# Patient Record
Sex: MALE | Race: WHITE | NOT HISPANIC OR LATINO | URBAN - METROPOLITAN AREA
[De-identification: names, ages, dates, MRNs, and addresses within clinical notes are randomized per-mention and may not be internally consistent; named-entity substitution may affect disease eponyms.]

---

## 2021-04-18 ENCOUNTER — HOSPITAL ENCOUNTER (INPATIENT)
Facility: HOSPITAL | Age: 31
LOS: 1 days | Discharge: HOME | End: 2021-04-19
Attending: EMERGENCY MEDICINE | Admitting: HOSPITALIST

## 2021-04-18 ENCOUNTER — APPOINTMENT (OUTPATIENT)
Dept: GENERAL RADIOLOGY | Facility: HOSPITAL | Age: 31
End: 2021-04-18

## 2021-04-18 DIAGNOSIS — F15.10 METHAMPHETAMINE ABUSE (HCC): ICD-10-CM

## 2021-04-18 DIAGNOSIS — R07.2 PRECORDIAL PAIN: ICD-10-CM

## 2021-04-18 DIAGNOSIS — E10.10 DIABETIC KETOACIDOSIS WITHOUT COMA ASSOCIATED WITH TYPE 1 DIABETES MELLITUS (HCC): Primary | ICD-10-CM

## 2021-04-18 PROBLEM — E87.29 HIGH ANION GAP METABOLIC ACIDOSIS: Status: ACTIVE | Noted: 2021-04-18

## 2021-04-18 LAB
ACETONE BLD QL: ABNORMAL
ALBUMIN SERPL-MCNC: 3.8 G/DL (ref 3.5–5.2)
ALBUMIN/GLOB SERPL: 1.2 G/DL
ALP SERPL-CCNC: 156 U/L (ref 39–117)
ALT SERPL W P-5'-P-CCNC: 126 U/L (ref 1–41)
ANION GAP SERPL CALCULATED.3IONS-SCNC: 11 MMOL/L (ref 5–15)
ANION GAP SERPL CALCULATED.3IONS-SCNC: 18 MMOL/L (ref 5–15)
ANION GAP SERPL CALCULATED.3IONS-SCNC: 32 MMOL/L (ref 5–15)
AST SERPL-CCNC: 91 U/L (ref 1–40)
ATMOSPHERIC PRESS: ABNORMAL MM[HG]
BASE EXCESS BLDV CALC-SCNC: -14.3 MMOL/L (ref -2–2)
BASOPHILS # BLD AUTO: 0.2 10*3/MM3 (ref 0–0.2)
BASOPHILS NFR BLD AUTO: 1 % (ref 0–1.5)
BDY SITE: ABNORMAL
BILIRUB SERPL-MCNC: 0.6 MG/DL (ref 0–1.2)
BILIRUB UR QL STRIP: NEGATIVE
BUN SERPL-MCNC: 14 MG/DL (ref 6–20)
BUN SERPL-MCNC: 18 MG/DL (ref 6–20)
BUN SERPL-MCNC: 20 MG/DL (ref 6–20)
BUN/CREAT SERPL: 14.6 (ref 7–25)
BUN/CREAT SERPL: 15.8 (ref 7–25)
BUN/CREAT SERPL: 16.9 (ref 7–25)
CALCIUM SPEC-SCNC: 8.3 MG/DL (ref 8.6–10.5)
CALCIUM SPEC-SCNC: 9.3 MG/DL (ref 8.6–10.5)
CALCIUM SPEC-SCNC: 9.9 MG/DL (ref 8.6–10.5)
CHLORIDE SERPL-SCNC: 103 MMOL/L (ref 98–107)
CHLORIDE SERPL-SCNC: 87 MMOL/L (ref 98–107)
CHLORIDE SERPL-SCNC: 98 MMOL/L (ref 98–107)
CLARITY UR: CLEAR
CO2 BLDA-SCNC: 13.5 MMOL/L (ref 22–29)
CO2 SERPL-SCNC: 10 MMOL/L (ref 22–29)
CO2 SERPL-SCNC: 18 MMOL/L (ref 22–29)
CO2 SERPL-SCNC: 23 MMOL/L (ref 22–29)
COLOR UR: YELLOW
CREAT SERPL-MCNC: 0.96 MG/DL (ref 0.76–1.27)
CREAT SERPL-MCNC: 1.14 MG/DL (ref 0.76–1.27)
CREAT SERPL-MCNC: 1.18 MG/DL (ref 0.76–1.27)
DEPRECATED RDW RBC AUTO: 43.3 FL (ref 37–54)
EOSINOPHIL # BLD AUTO: 0.1 10*3/MM3 (ref 0–0.4)
EOSINOPHIL NFR BLD AUTO: 0.6 % (ref 0.3–6.2)
ERYTHROCYTE [DISTWIDTH] IN BLOOD BY AUTOMATED COUNT: 12.7 % (ref 12.3–15.4)
GFR SERPL CREATININE-BSD FRML MDRD: 72 ML/MIN/1.73
GFR SERPL CREATININE-BSD FRML MDRD: 75 ML/MIN/1.73
GFR SERPL CREATININE-BSD FRML MDRD: 91 ML/MIN/1.73
GLOBULIN UR ELPH-MCNC: 3.1 GM/DL
GLUCOSE BLDC GLUCOMTR-MCNC: 110 MG/DL (ref 70–105)
GLUCOSE BLDC GLUCOMTR-MCNC: 120 MG/DL (ref 70–105)
GLUCOSE BLDC GLUCOMTR-MCNC: 149 MG/DL (ref 70–105)
GLUCOSE BLDC GLUCOMTR-MCNC: 173 MG/DL (ref 70–105)
GLUCOSE BLDC GLUCOMTR-MCNC: 220 MG/DL (ref 70–105)
GLUCOSE BLDC GLUCOMTR-MCNC: 283 MG/DL (ref 70–105)
GLUCOSE BLDC GLUCOMTR-MCNC: 294 MG/DL (ref 70–105)
GLUCOSE BLDC GLUCOMTR-MCNC: 368 MG/DL (ref 70–105)
GLUCOSE BLDC GLUCOMTR-MCNC: 432 MG/DL (ref 70–105)
GLUCOSE BLDC GLUCOMTR-MCNC: >500 MG/DL (ref 70–105)
GLUCOSE SERPL-MCNC: 138 MG/DL (ref 65–99)
GLUCOSE SERPL-MCNC: 328 MG/DL (ref 65–99)
GLUCOSE SERPL-MCNC: 572 MG/DL (ref 65–99)
GLUCOSE UR STRIP-MCNC: ABNORMAL MG/DL
HCO3 BLDV-SCNC: 12.5 MMOL/L (ref 22–26)
HCT VFR BLD AUTO: 43.7 % (ref 37.5–51)
HGB BLD-MCNC: 14.6 G/DL (ref 13–17.7)
HGB UR QL STRIP.AUTO: NEGATIVE
HOLD SPECIMEN: NORMAL
KETONES UR QL STRIP: ABNORMAL
LEUKOCYTE ESTERASE UR QL STRIP.AUTO: NEGATIVE
LIPASE SERPL-CCNC: 33 U/L (ref 13–60)
LYMPHOCYTES # BLD AUTO: 5.1 10*3/MM3 (ref 0.7–3.1)
LYMPHOCYTES NFR BLD AUTO: 24.8 % (ref 19.6–45.3)
MCH RBC QN AUTO: 32.3 PG (ref 26.6–33)
MCHC RBC AUTO-ENTMCNC: 33.5 G/DL (ref 31.5–35.7)
MCV RBC AUTO: 96.5 FL (ref 79–97)
MODALITY: ABNORMAL
MONOCYTES # BLD AUTO: 1.5 10*3/MM3 (ref 0.1–0.9)
MONOCYTES NFR BLD AUTO: 7.2 % (ref 5–12)
NEUTROPHILS NFR BLD AUTO: 13.5 10*3/MM3 (ref 1.7–7)
NEUTROPHILS NFR BLD AUTO: 66.4 % (ref 42.7–76)
NITRITE UR QL STRIP: NEGATIVE
NRBC BLD AUTO-RTO: 0.1 /100 WBC (ref 0–0.2)
PCO2 BLDV: 32 MM HG (ref 42–51)
PH BLDV: 7.2 PH UNITS (ref 7.32–7.43)
PH UR STRIP.AUTO: <=5 [PH] (ref 5–8)
PLATELET # BLD AUTO: 323 10*3/MM3 (ref 140–450)
PMV BLD AUTO: 9.9 FL (ref 6–12)
PO2 BLDV: 41.5 MM HG (ref 40–42)
POTASSIUM SERPL-SCNC: 4.3 MMOL/L (ref 3.5–5.2)
POTASSIUM SERPL-SCNC: 4.4 MMOL/L (ref 3.5–5.2)
POTASSIUM SERPL-SCNC: 4.5 MMOL/L (ref 3.5–5.2)
PROT SERPL-MCNC: 6.9 G/DL (ref 6–8.5)
PROT UR QL STRIP: NEGATIVE
QT INTERVAL: 444 MS
RBC # BLD AUTO: 4.53 10*6/MM3 (ref 4.14–5.8)
SAO2 % BLDCOV: 66.4 % (ref 95–99)
SODIUM SERPL-SCNC: 129 MMOL/L (ref 136–145)
SODIUM SERPL-SCNC: 134 MMOL/L (ref 136–145)
SODIUM SERPL-SCNC: 137 MMOL/L (ref 136–145)
SP GR UR STRIP: 1.03 (ref 1–1.03)
TROPONIN T SERPL-MCNC: <0.01 NG/ML (ref 0–0.03)
UROBILINOGEN UR QL STRIP: ABNORMAL
WBC # BLD AUTO: 20.4 10*3/MM3 (ref 3.4–10.8)

## 2021-04-18 PROCEDURE — 25010000002 DIPHENHYDRAMINE PER 50 MG: Performed by: EMERGENCY MEDICINE

## 2021-04-18 PROCEDURE — 25010000002 CEFTRIAXONE PER 250 MG: Performed by: NURSE PRACTITIONER

## 2021-04-18 PROCEDURE — 82962 GLUCOSE BLOOD TEST: CPT

## 2021-04-18 PROCEDURE — 63710000001 INSULIN REGULAR HUMAN PER 5 UNITS: Performed by: HOSPITALIST

## 2021-04-18 PROCEDURE — 80048 BASIC METABOLIC PNL TOTAL CA: CPT | Performed by: HOSPITALIST

## 2021-04-18 PROCEDURE — 85025 COMPLETE CBC W/AUTO DIFF WBC: CPT | Performed by: EMERGENCY MEDICINE

## 2021-04-18 PROCEDURE — 82803 BLOOD GASES ANY COMBINATION: CPT

## 2021-04-18 PROCEDURE — 83690 ASSAY OF LIPASE: CPT | Performed by: EMERGENCY MEDICINE

## 2021-04-18 PROCEDURE — 99285 EMERGENCY DEPT VISIT HI MDM: CPT

## 2021-04-18 PROCEDURE — 93005 ELECTROCARDIOGRAM TRACING: CPT | Performed by: EMERGENCY MEDICINE

## 2021-04-18 PROCEDURE — 81003 URINALYSIS AUTO W/O SCOPE: CPT | Performed by: HOSPITALIST

## 2021-04-18 PROCEDURE — 82009 KETONE BODYS QUAL: CPT | Performed by: EMERGENCY MEDICINE

## 2021-04-18 PROCEDURE — 25010000002 ENOXAPARIN PER 10 MG: Performed by: HOSPITALIST

## 2021-04-18 PROCEDURE — 84484 ASSAY OF TROPONIN QUANT: CPT | Performed by: EMERGENCY MEDICINE

## 2021-04-18 PROCEDURE — 99223 1ST HOSP IP/OBS HIGH 75: CPT | Performed by: HOSPITALIST

## 2021-04-18 PROCEDURE — 83036 HEMOGLOBIN GLYCOSYLATED A1C: CPT | Performed by: HOSPITALIST

## 2021-04-18 PROCEDURE — 80053 COMPREHEN METABOLIC PANEL: CPT | Performed by: EMERGENCY MEDICINE

## 2021-04-18 PROCEDURE — 25010000002 DROPERIDOL PER 5 MG: Performed by: EMERGENCY MEDICINE

## 2021-04-18 PROCEDURE — 63710000001 INSULIN GLARGINE PER 5 UNITS: Performed by: NURSE PRACTITIONER

## 2021-04-18 PROCEDURE — 87040 BLOOD CULTURE FOR BACTERIA: CPT | Performed by: EMERGENCY MEDICINE

## 2021-04-18 PROCEDURE — 71045 X-RAY EXAM CHEST 1 VIEW: CPT

## 2021-04-18 PROCEDURE — 63710000001 INSULIN REGULAR HUMAN PER 5 UNITS: Performed by: EMERGENCY MEDICINE

## 2021-04-18 RX ORDER — INSULIN GLARGINE 100 [IU]/ML
10 INJECTION, SOLUTION SUBCUTANEOUS EVERY 12 HOURS SCHEDULED
Status: DISCONTINUED | OUTPATIENT
Start: 2021-04-18 | End: 2021-04-19 | Stop reason: HOSPADM

## 2021-04-18 RX ORDER — DROPERIDOL 2.5 MG/ML
1.25 INJECTION, SOLUTION INTRAMUSCULAR; INTRAVENOUS ONCE
Status: COMPLETED | OUTPATIENT
Start: 2021-04-18 | End: 2021-04-18

## 2021-04-18 RX ORDER — CHOLECALCIFEROL (VITAMIN D3) 125 MCG
5 CAPSULE ORAL NIGHTLY PRN
Status: DISCONTINUED | OUTPATIENT
Start: 2021-04-18 | End: 2021-04-19 | Stop reason: HOSPADM

## 2021-04-18 RX ORDER — DEXTROSE, SODIUM CHLORIDE, AND POTASSIUM CHLORIDE 5; .9; .15 G/100ML; G/100ML; G/100ML
150 INJECTION INTRAVENOUS CONTINUOUS PRN
Status: DISCONTINUED | OUTPATIENT
Start: 2021-04-18 | End: 2021-04-18

## 2021-04-18 RX ORDER — ACETAMINOPHEN 325 MG/1
650 TABLET ORAL EVERY 4 HOURS PRN
Status: DISCONTINUED | OUTPATIENT
Start: 2021-04-18 | End: 2021-04-19 | Stop reason: HOSPADM

## 2021-04-18 RX ORDER — DEXTROSE MONOHYDRATE 25 G/50ML
12.5 INJECTION, SOLUTION INTRAVENOUS AS NEEDED
Status: DISCONTINUED | OUTPATIENT
Start: 2021-04-18 | End: 2021-04-18

## 2021-04-18 RX ORDER — DEXTROSE AND SODIUM CHLORIDE 5; .45 G/100ML; G/100ML
150 INJECTION, SOLUTION INTRAVENOUS CONTINUOUS PRN
Status: DISCONTINUED | OUTPATIENT
Start: 2021-04-18 | End: 2021-04-18

## 2021-04-18 RX ORDER — DIPHENHYDRAMINE HYDROCHLORIDE 50 MG/ML
25 INJECTION INTRAMUSCULAR; INTRAVENOUS ONCE
Status: COMPLETED | OUTPATIENT
Start: 2021-04-18 | End: 2021-04-18

## 2021-04-18 RX ORDER — SODIUM CHLORIDE 9 MG/ML
100 INJECTION, SOLUTION INTRAVENOUS CONTINUOUS
Status: DISCONTINUED | OUTPATIENT
Start: 2021-04-18 | End: 2021-04-19 | Stop reason: HOSPADM

## 2021-04-18 RX ORDER — SODIUM CHLORIDE 0.9 % (FLUSH) 0.9 %
10 SYRINGE (ML) INJECTION ONCE AS NEEDED
Status: DISCONTINUED | OUTPATIENT
Start: 2021-04-18 | End: 2021-04-19 | Stop reason: HOSPADM

## 2021-04-18 RX ORDER — ACETAMINOPHEN 160 MG/5ML
650 SOLUTION ORAL EVERY 4 HOURS PRN
Status: DISCONTINUED | OUTPATIENT
Start: 2021-04-18 | End: 2021-04-19 | Stop reason: HOSPADM

## 2021-04-18 RX ORDER — SODIUM CHLORIDE 0.9 % (FLUSH) 0.9 %
10 SYRINGE (ML) INJECTION EVERY 12 HOURS SCHEDULED
Status: DISCONTINUED | OUTPATIENT
Start: 2021-04-18 | End: 2021-04-19 | Stop reason: HOSPADM

## 2021-04-18 RX ORDER — SODIUM CHLORIDE AND POTASSIUM CHLORIDE 300; 900 MG/100ML; MG/100ML
250 INJECTION, SOLUTION INTRAVENOUS CONTINUOUS PRN
Status: DISCONTINUED | OUTPATIENT
Start: 2021-04-18 | End: 2021-04-18

## 2021-04-18 RX ORDER — ACETAMINOPHEN 650 MG/1
650 SUPPOSITORY RECTAL EVERY 4 HOURS PRN
Status: DISCONTINUED | OUTPATIENT
Start: 2021-04-18 | End: 2021-04-19 | Stop reason: HOSPADM

## 2021-04-18 RX ORDER — SODIUM CHLORIDE 9 MG/ML
10 INJECTION, SOLUTION INTRAVENOUS CONTINUOUS PRN
Status: DISCONTINUED | OUTPATIENT
Start: 2021-04-18 | End: 2021-04-18

## 2021-04-18 RX ORDER — SODIUM CHLORIDE 9 MG/ML
250 INJECTION, SOLUTION INTRAVENOUS CONTINUOUS PRN
Status: DISCONTINUED | OUTPATIENT
Start: 2021-04-18 | End: 2021-04-18

## 2021-04-18 RX ORDER — ONDANSETRON 2 MG/ML
4 INJECTION INTRAMUSCULAR; INTRAVENOUS EVERY 6 HOURS PRN
Status: DISCONTINUED | OUTPATIENT
Start: 2021-04-18 | End: 2021-04-19 | Stop reason: HOSPADM

## 2021-04-18 RX ORDER — DEXTROSE, SODIUM CHLORIDE, AND POTASSIUM CHLORIDE 5; .45; .3 G/100ML; G/100ML; G/100ML
150 INJECTION INTRAVENOUS CONTINUOUS PRN
Status: DISCONTINUED | OUTPATIENT
Start: 2021-04-18 | End: 2021-04-18

## 2021-04-18 RX ORDER — INSULIN LISPRO 100 [IU]/ML
0-14 INJECTION, SOLUTION INTRAVENOUS; SUBCUTANEOUS AS NEEDED
Status: DISCONTINUED | OUTPATIENT
Start: 2021-04-18 | End: 2021-04-19 | Stop reason: HOSPADM

## 2021-04-18 RX ORDER — POTASSIUM CHLORIDE, DEXTROSE MONOHYDRATE AND SODIUM CHLORIDE 300; 5; 900 MG/100ML; G/100ML; MG/100ML
150 INJECTION, SOLUTION INTRAVENOUS CONTINUOUS PRN
Status: DISCONTINUED | OUTPATIENT
Start: 2021-04-18 | End: 2021-04-18

## 2021-04-18 RX ORDER — DEXTROSE AND SODIUM CHLORIDE 5; .9 G/100ML; G/100ML
150 INJECTION, SOLUTION INTRAVENOUS CONTINUOUS PRN
Status: DISCONTINUED | OUTPATIENT
Start: 2021-04-18 | End: 2021-04-18

## 2021-04-18 RX ORDER — SODIUM CHLORIDE AND POTASSIUM CHLORIDE 150; 450 MG/100ML; MG/100ML
250 INJECTION, SOLUTION INTRAVENOUS CONTINUOUS PRN
Status: DISCONTINUED | OUTPATIENT
Start: 2021-04-18 | End: 2021-04-18

## 2021-04-18 RX ORDER — INSULIN LISPRO 100 [IU]/ML
0-14 INJECTION, SOLUTION INTRAVENOUS; SUBCUTANEOUS
Status: DISCONTINUED | OUTPATIENT
Start: 2021-04-19 | End: 2021-04-19 | Stop reason: HOSPADM

## 2021-04-18 RX ORDER — NICOTINE POLACRILEX 4 MG
15 LOZENGE BUCCAL
Status: DISCONTINUED | OUTPATIENT
Start: 2021-04-18 | End: 2021-04-19 | Stop reason: HOSPADM

## 2021-04-18 RX ORDER — SODIUM CHLORIDE 0.9 % (FLUSH) 0.9 %
3 SYRINGE (ML) INJECTION EVERY 12 HOURS SCHEDULED
Status: DISCONTINUED | OUTPATIENT
Start: 2021-04-18 | End: 2021-04-18

## 2021-04-18 RX ORDER — SODIUM CHLORIDE AND POTASSIUM CHLORIDE 150; 900 MG/100ML; MG/100ML
250 INJECTION, SOLUTION INTRAVENOUS CONTINUOUS PRN
Status: DISCONTINUED | OUTPATIENT
Start: 2021-04-18 | End: 2021-04-18

## 2021-04-18 RX ORDER — SODIUM CHLORIDE 0.9 % (FLUSH) 0.9 %
10 SYRINGE (ML) INJECTION AS NEEDED
Status: DISCONTINUED | OUTPATIENT
Start: 2021-04-18 | End: 2021-04-19 | Stop reason: HOSPADM

## 2021-04-18 RX ORDER — SODIUM CHLORIDE 9 MG/ML
500 INJECTION, SOLUTION INTRAVENOUS CONTINUOUS
Status: DISCONTINUED | OUTPATIENT
Start: 2021-04-18 | End: 2021-04-18

## 2021-04-18 RX ORDER — SODIUM CHLORIDE 450 MG/100ML
250 INJECTION, SOLUTION INTRAVENOUS CONTINUOUS PRN
Status: DISCONTINUED | OUTPATIENT
Start: 2021-04-18 | End: 2021-04-18

## 2021-04-18 RX ORDER — DEXTROSE, SODIUM CHLORIDE, AND POTASSIUM CHLORIDE 5; .45; .15 G/100ML; G/100ML; G/100ML
150 INJECTION INTRAVENOUS CONTINUOUS PRN
Status: DISCONTINUED | OUTPATIENT
Start: 2021-04-18 | End: 2021-04-18

## 2021-04-18 RX ORDER — DEXTROSE MONOHYDRATE 25 G/50ML
25 INJECTION, SOLUTION INTRAVENOUS
Status: DISCONTINUED | OUTPATIENT
Start: 2021-04-18 | End: 2021-04-19 | Stop reason: HOSPADM

## 2021-04-18 RX ADMIN — SODIUM CHLORIDE 500 ML/HR: 9 INJECTION, SOLUTION INTRAVENOUS at 10:52

## 2021-04-18 RX ADMIN — DEXTROSE AND SODIUM CHLORIDE 150 ML/HR: 5; 900 INJECTION, SOLUTION INTRAVENOUS at 17:42

## 2021-04-18 RX ADMIN — ENOXAPARIN SODIUM 40 MG: 40 INJECTION SUBCUTANEOUS at 15:28

## 2021-04-18 RX ADMIN — SODIUM CHLORIDE 1000 ML: 9 INJECTION, SOLUTION INTRAVENOUS at 09:04

## 2021-04-18 RX ADMIN — DROPERIDOL 1.25 MG: 2.5 INJECTION, SOLUTION INTRAMUSCULAR; INTRAVENOUS at 09:27

## 2021-04-18 RX ADMIN — DIPHENHYDRAMINE HYDROCHLORIDE 25 MG: 50 INJECTION, SOLUTION INTRAMUSCULAR; INTRAVENOUS at 09:03

## 2021-04-18 RX ADMIN — DROPERIDOL 1.25 MG: 2.5 INJECTION, SOLUTION INTRAMUSCULAR; INTRAVENOUS at 09:03

## 2021-04-18 RX ADMIN — Medication 10 ML: at 12:55

## 2021-04-18 RX ADMIN — INSULIN GLARGINE 10 UNITS: 100 INJECTION, SOLUTION SUBCUTANEOUS at 19:36

## 2021-04-18 RX ADMIN — CEFTRIAXONE SODIUM 1 G: 1 INJECTION, POWDER, FOR SOLUTION INTRAMUSCULAR; INTRAVENOUS at 20:21

## 2021-04-18 RX ADMIN — SODIUM CHLORIDE 1000 ML: 0.9 INJECTION, SOLUTION INTRAVENOUS at 09:05

## 2021-04-18 RX ADMIN — SODIUM CHLORIDE 7 UNITS/HR: 9 INJECTION, SOLUTION INTRAVENOUS at 12:15

## 2021-04-18 RX ADMIN — SODIUM CHLORIDE 100 ML/HR: 9 INJECTION, SOLUTION INTRAVENOUS at 20:20

## 2021-04-18 RX ADMIN — SODIUM CHLORIDE 6 UNITS/HR: 9 INJECTION, SOLUTION INTRAVENOUS at 10:19

## 2021-04-18 NOTE — ED PROVIDER NOTES
Subjective   History of Present Illness  Context: 30-year-old history of insulin pen diabetes presents with elevated blood sugar.  He states he last used insulin yesterday.  He is currently out of supplies to continue giving himself insulin. he complains of pain in his chest.  He does not complain of shortness of breath he does not complain of vomiting.  He reports some headache.  He reports he last use ice 2 days ago.  He reports previous history of heroin abuse.  He has had methamphetamine abuse for the last couple of years he reports.  He states he still is urinating.  Location: Chest  Quality: Pain  Duration: Since last night  Timing: Constant  Severity: Moderate severe   Modifying factors: Insulin-dependent bed ache has not had insulin today IV drug abuse  Associated signs and symptoms: Elevated blood sugar    Review of Systems  Complete review of systems not obtainable at this time due to patient's current behavior.  He did not report vomiting diarrhea he reported he did urinate this morning.  He did not report feeling short of breath.  No past medical history on file.  IV drug abuse, insulin-dependent diabetic  No Known Allergies    No past surgical history on file.    No family history on file.    Social History     Socioeconomic History   • Marital status: Single     Spouse name: Not on file   • Number of children: Not on file   • Years of education: Not on file   • Highest education level: Not on file       Social history IV drug abuse methamphetamine  Only current medications insulin  Objective   Physical Exam  30-year-old male awake alert.  He is somewhat agitated yelling out at times.  Pupils equal round react light.  Oropharynx dry neck supple chest clear equal breath sounds cardiovascular regular rhythm abdomen soft nontender examination extremities had some track marks.  There is notes of cellulitis.  Neurologic exam without obvious focal findings noted.  Procedures           ED Course      Results  for orders placed or performed during the hospital encounter of 04/18/21   Comprehensive Metabolic Panel    Specimen: Blood   Result Value Ref Range    Glucose 572 (C) 65 - 99 mg/dL    BUN 20 6 - 20 mg/dL    Creatinine 1.18 0.76 - 1.27 mg/dL    Sodium 129 (L) 136 - 145 mmol/L    Potassium 4.4 3.5 - 5.2 mmol/L    Chloride 87 (L) 98 - 107 mmol/L    CO2 10.0 (L) 22.0 - 29.0 mmol/L    Calcium 9.9 8.6 - 10.5 mg/dL    Total Protein 6.9 6.0 - 8.5 g/dL    Albumin 3.80 3.50 - 5.20 g/dL    ALT (SGPT) 126 (H) 1 - 41 U/L    AST (SGOT) 91 (H) 1 - 40 U/L    Alkaline Phosphatase 156 (H) 39 - 117 U/L    Total Bilirubin 0.6 0.0 - 1.2 mg/dL    eGFR Non African Amer 72 >60 mL/min/1.73    Globulin 3.1 gm/dL    A/G Ratio 1.2 g/dL    BUN/Creatinine Ratio 16.9 7.0 - 25.0    Anion Gap 32.0 (H) 5.0 - 15.0 mmol/L   Acetone    Specimen: Blood   Result Value Ref Range    Acetone Small (A) Negative   Lipase    Specimen: Blood   Result Value Ref Range    Lipase 33 13 - 60 U/L   CBC Auto Differential    Specimen: Blood   Result Value Ref Range    WBC 20.40 (H) 3.40 - 10.80 10*3/mm3    RBC 4.53 4.14 - 5.80 10*6/mm3    Hemoglobin 14.6 13.0 - 17.7 g/dL    Hematocrit 43.7 37.5 - 51.0 %    MCV 96.5 79.0 - 97.0 fL    MCH 32.3 26.6 - 33.0 pg    MCHC 33.5 31.5 - 35.7 g/dL    RDW 12.7 12.3 - 15.4 %    RDW-SD 43.3 37.0 - 54.0 fl    MPV 9.9 6.0 - 12.0 fL    Platelets 323 140 - 450 10*3/mm3    Neutrophil % 66.4 42.7 - 76.0 %    Lymphocyte % 24.8 19.6 - 45.3 %    Monocyte % 7.2 5.0 - 12.0 %    Eosinophil % 0.6 0.3 - 6.2 %    Basophil % 1.0 0.0 - 1.5 %    Neutrophils, Absolute 13.50 (H) 1.70 - 7.00 10*3/mm3    Lymphocytes, Absolute 5.10 (H) 0.70 - 3.10 10*3/mm3    Monocytes, Absolute 1.50 (H) 0.10 - 0.90 10*3/mm3    Eosinophils, Absolute 0.10 0.00 - 0.40 10*3/mm3    Basophils, Absolute 0.20 0.00 - 0.20 10*3/mm3    nRBC 0.1 0.0 - 0.2 /100 WBC   Troponin    Specimen: Blood   Result Value Ref Range    Troponin T <0.010 0.000 - 0.030 ng/mL   Blood Gas, Venous -     "Specimen: Venous Blood   Result Value Ref Range    Site CentralLine     pH, Venous 7.199 (C) 7.320 - 7.430 pH Units    pCO2, Venous 32.0 (L) 42.0 - 51.0 mm Hg    pO2, Venous 41.5 40.0 - 42.0 mm Hg    HCO3, Venous 12.5 (L) 22.0 - 26.0 mmol/L    Base Excess, Venous -14.3 (L) -2.0 - 2.0 mmol/L    O2 Saturation, Venous 66.4 (L) 95.0 - 99.0 %    CO2 Content 13.5 (L) 22 - 29 mmol/L    Barometric Pressure for Blood Gas      Modality Room Air    POC Glucose Once    Specimen: Blood   Result Value Ref Range    Glucose >500 (C) 70 - 105 mg/dL   ECG 12 Lead   Result Value Ref Range    QT Interval 444 ms   ECG 12 Lead   Result Value Ref Range    QT Interval 378 ms   Gold Top - SST   Result Value Ref Range    Extra Tube Hold for add-ons.      XR Chest 1 View    Result Date: 4/18/2021  1.No acute pulmonary process.  Electronically Signed By-Sam Orlando MD On:4/18/2021 9:52 AM This report was finalized on [id number] by  Sam Orlando MD.    Medications   insulin regular 1 Units/mL in sodium chloride 0.9 % 100 mL infusion (has no administration in time range)   sodium chloride 0.9 % bolus 1,000 mL (0 mL Intravenous Stopped 4/18/21 1012)   sodium chloride 0.9 % bolus 1,000 mL (0 mL Intravenous Stopped 4/18/21 1012)   diphenhydrAMINE (BENADRYL) injection 25 mg (25 mg Intravenous Given 4/18/21 0903)   droperidol (INAPSINE) injection 1.25 mg (1.25 mg Intravenous Given 4/18/21 0903)   droperidol (INAPSINE) injection 1.25 mg (1.25 mg Intravenous Given 4/18/21 0927)     /40   Pulse 119   Temp 97 °F (36.1 °C)   Resp 28   Ht 172.7 cm (68\")   Wt 75.3 kg (166 lb)   SpO2 98%   BMI 25.24 kg/m²                                        MDM  Chart review: No previous records to review  Comorbidity: As per past history  Differential: DKA, angina, endocarditis,  My EKG interpretation: Sinus rhythm nonspecific intraventricular conduction delay T wave somewhat prominent consider hyperkalemia.  Lab: White count 20,400 with 66 segs no " bands.  Venous blood gas reveals pH 7.19, comprehensive metabolic panel   reveals glucose 572 sodium 129 CO2 10  AST 91 alkaline phosphatase 156 urinalysis and urine DOA pending, lipase normal troponin less than 0.01, acetone positive small  Radiology: I reviewed chest x-ray.  No acute cardiopulmonary abnormality noted.  Discussion/treatment: Patient had IV placed.  Was given 2 L normal saline bolus.  He received Inapsine 1.25 mg IV x2 doses and Benadryl 25 mg x 1 dose.  He had improvement in his yelling out with this.  Repeat evaluation he is now sleeping.  He is noted to have Kussmaul type respirations consistent with his DKA.  He was started on insulin drip.  Patient was discussed with Dr. Murphy.  He will be admitted to PCU for continued care.  Patient critical care time of 30 minutes independent of procedures  Patient was evaluated using appropriate PPE      Final diagnoses:   Diabetic ketoacidosis without coma associated with type 1 diabetes mellitus (CMS/HCC)   Precordial pain   Methamphetamine abuse (CMS/HCC)       ED Disposition  ED Disposition     ED Disposition Condition Comment    Decision to Admit            No follow-up provider specified.       Medication List      No changes were made to your prescriptions during this visit.          Blayne Oconnell MD  04/18/21 1200

## 2021-04-18 NOTE — H&P
[facility] Medicine Services      Patient Name: David Pastor  : 1990  MRN: [id number]  Primary Care Physician: Provider, No Known  Date of admission: 2021    Patient Care Team:  Provider, No Known as PCP - General          Subjective   History Present Illness     Chief Complaint:   Chief Complaint   Patient presents with   • POLYSUBSTANCE DEPENDENCE     The patient is a 30-year-old male with history of diabetes mellitus that has not been taking his insulin.  The patient is unable to elaborate when he stopped taking his insulin but came to the emergency room on 2021 and claims he ran out of insulin.  The patient admitted to prior history of heroin and methamphetamine abuse.    Laboratory work was significant for pH 7.19, blood glucose 572, bicarb 10 and gap metabolic acidosis.  Chest x-ray is without acute pathology.  The patient was given Benadryl and droperidol the ED for agitation.        Review of Systems   All other systems reviewed and are negative.          Personal History     Past Medical History:     Diabetes mellitus, history of substance abuse      Surgical History:        Denies surgical history      Family History:     Denies family history of CAD    Social History:      Patient denies Bacot, alcohol or recreational drug use.    Medications:  Prior to Admission medications    Not on File     Patient does not remember his insulin regimen.      Allergies:  No Known Allergies    Objective   Objective     Vital Signs  Temp:  [97 °F (36.1 °C)-98.6 °F (37 °C)] 98.6 °F (37 °C)  Heart Rate:  [] 104  Resp:  [16-28] 18  BP: (109-137)/(39-76) 124/76  SpO2:  [97 %-99 %] 98 %  on   ;   Device (Oxygen Therapy): room air  Body mass index is 24.87 kg/m².    Physical Exam  HENT:      Head: Normocephalic.      Nose: Nose normal.   Eyes:      Extraocular Movements: Extraocular movements intact.      Pupils: Pupils are equal, round, and reactive to light.   Cardiovascular:       Rate and Rhythm: Normal rate and regular rhythm.   Pulmonary:      Effort: Pulmonary effort is normal.      Breath sounds: Normal breath sounds.   Abdominal:      General: Bowel sounds are normal.   Musculoskeletal:         General: Normal range of motion.      Cervical back: Normal range of motion.   Skin:     General: Skin is warm.      Comments: Multiple tattoos on body.   Neurological:      General: No focal deficit present.   Psychiatric:      Comments: Poor eye contact.           Results Review:  I have personally reviewed most recent lab results.    Results from last 7 days   Lab Units 04/18/21  0905   WBC 10*3/mm3 20.40*   HEMOGLOBIN g/dL 14.6   HEMATOCRIT % 43.7   PLATELETS 10*3/mm3 323     Results from last 7 days   Lab Units 04/18/21  1749 04/18/21  0905   SODIUM mmol/L 137 129*   POTASSIUM mmol/L 4.3 4.4   CHLORIDE mmol/L 103 87*   CO2 mmol/L 23.0 10.0*   BUN mg/dL 14 20   CREATININE mg/dL 0.96 1.18   GLUCOSE mg/dL 138* 572*   CALCIUM mg/dL 8.3* 9.9   ALT (SGPT) U/L  --  126*   AST (SGOT) U/L  --  91*   TROPONIN T ng/mL  --  <0.010     Estimated Creatinine Clearance: 117 mL/min (by C-G formula based on SCr of 0.96 mg/dL).  Brief Urine Lab Results  (Last result in the past 365 days)      Color   Clarity   Blood   Leuk Est   Nitrite   Protein   CREAT   Urine HCG        04/18/21 1147 Yellow Clear Negative Negative Negative Negative               Microbiology Results (last 10 days)     ** No results found for the last 240 hours. **          ECG/EMG Results (most recent)     Procedure Component Value Units Date/Time    ECG 12 Lead [[id number]] Collected: 04/18/21 0850     Updated: 04/18/21 0851     QT Interval 444 ms     Narrative:      HEART RATE= 91  bpm  RR Interval= 660  ms  DC Interval= 129  ms  P Horizontal Axis= 4  deg  P Front Axis= 104  deg  QRSD Interval= 114  ms  QT Interval= 444  ms  QRS Axis= 142  deg  T Wave Axis= 17  deg  - ABNORMAL ECG -  Serial comparison ignored immediately previous  ECG(s) - Too recent  Right and left arm electrode reversal, interpretation assumes no reversal  Sinus rhythm  ST elev, probable normal early repol pattern  Prolonged QT interval  Electronically Signed By:   Date and Time of Study: 2021-04-18 08:50:27    ECG 12 Lead [[id number]] Collected: 04/18/21 0848     Updated: 04/18/21 0927     QT Interval 378 ms     Narrative:      HEART RATE= 91  bpm  RR Interval= 656  ms  AL Interval= 159  ms  P Horizontal Axis= 19  deg  P Front Axis= 44  deg  QRSD Interval= 126  ms  QT Interval= 378  ms  QRS Axis= 91  deg  T Wave Axis= 44  deg  - ABNORMAL ECG -  Sinus rhythm  Nonspecific intraventricular conduction delay  Inferior infarct, acute (RCA)  ST elevation, consider lateral injury  No previous ECG available for comparison  Electronically Signed By:   Date and Time of Study: 2021-04-18 08:48:38                  XR Chest 1 View    Result Date: 4/18/2021  1.No acute pulmonary process.  Electronically Signed By-Sam Orlando MD On:4/18/2021 9:52 AM This report was finalized on [id number] by  Sam Orlando MD.        Estimated Creatinine Clearance: 117 mL/min (by C-G formula based on SCr of 0.96 mg/dL).    Assessment/Plan   Assessment/Plan       Active Hospital Problems    Diagnosis  POA   • **Diabetic ketoacidosis without coma associated with type 1 diabetes mellitus (CMS/HCC) [E10.10]  Yes     Priority: High   • Methamphetamine abuse (CMS/HCC) [F15.10]  Yes     Priority: High   • High anion gap metabolic acidosis [E87.2]  Yes     Priority: High      Resolved Hospital Problems   No resolved problems to display.       Diabetic ketoacidosis:  -Patient admits to insulin noncompliance  -Admit to PCU on insulin drip and IVF  -Chest x-ray without cardiopulmonary process                VTE Prophylaxis -   Mechanical Order History:     None      Pharmalogical Order History:      Ordered     Dose Route Frequency Stop    04/18/21 1129  enoxaparin (LOVENOX) syringe 40 mg      40 mg SC Every 24  Hours --                CODE STATUS:    Code Status and Medical Interventions:   Ordered at: 04/18/21 1032     Code Status:    CPR     Medical Interventions (Level of Support Prior to Arrest):    Full       This patient has been examined wearing appropriate Personal Protective Equipment and discussed with nursing. 04/18/21      I discussed the patient's findings and my recommendations with patient.      Signature: Electronically signed by Amilcar Arguello DO, 04/18/21, 7:45 PM EDT.    [facility] Hospitalist Team

## 2021-04-18 NOTE — ED NOTES
[facility] includign BS reading HI on glucometer. VS noted provider in room for eval. Pt reports POLYSUBSTANCE DEP. Used approx > 48hrs ago.      Jose Hermosillo RN  04/18/21 0931

## 2021-04-18 NOTE — NURSING NOTE
Unable to address admission questions. Patient lethargic but will arouse to say he hears nurse and does not answer questions. Unable to do full skin assessment as patient will not remove pants.

## 2021-04-19 VITALS
WEIGHT: 163.58 LBS | HEIGHT: 68 IN | RESPIRATION RATE: 14 BRPM | OXYGEN SATURATION: 100 % | BODY MASS INDEX: 24.79 KG/M2 | TEMPERATURE: 97.9 F | HEART RATE: 109 BPM | DIASTOLIC BLOOD PRESSURE: 90 MMHG | SYSTOLIC BLOOD PRESSURE: 146 MMHG

## 2021-04-19 LAB
GLUCOSE BLDC GLUCOMTR-MCNC: 290 MG/DL (ref 70–105)
GLUCOSE BLDC GLUCOMTR-MCNC: 299 MG/DL (ref 70–105)
HBA1C MFR BLD: 14 % (ref 3.5–5.6)

## 2021-04-19 PROCEDURE — 82962 GLUCOSE BLOOD TEST: CPT

## 2021-04-19 PROCEDURE — 63710000001 INSULIN LISPRO (HUMAN) PER 5 UNITS: Performed by: NURSE PRACTITIONER

## 2021-04-19 PROCEDURE — 99239 HOSP IP/OBS DSCHRG MGMT >30: CPT | Performed by: HOSPITALIST

## 2021-04-19 PROCEDURE — 63710000001 INSULIN GLARGINE PER 5 UNITS: Performed by: NURSE PRACTITIONER

## 2021-04-19 RX ORDER — INSULIN LISPRO 100 [IU]/ML
4 INJECTION, SOLUTION INTRAVENOUS; SUBCUTANEOUS
Refills: 12
Start: 2021-04-19

## 2021-04-19 RX ORDER — INSULIN GLARGINE 100 [IU]/ML
10 INJECTION, SOLUTION SUBCUTANEOUS EVERY 12 HOURS SCHEDULED
Refills: 12
Start: 2021-04-19 | End: 2021-05-19

## 2021-04-19 RX ORDER — PEN NEEDLE, DIABETIC 30 GX3/16"
1 NEEDLE, DISPOSABLE MISCELLANEOUS
Qty: 100 EACH | Refills: 0 | Status: CANCELLED | OUTPATIENT
Start: 2021-04-19

## 2021-04-19 RX ORDER — HUMAN INSULIN 100 [IU]/ML
INJECTION, SUSPENSION SUBCUTANEOUS
Refills: 0 | Status: CANCELLED | OUTPATIENT
Start: 2021-04-19

## 2021-04-19 RX ADMIN — Medication 10 ML: at 05:38

## 2021-04-19 RX ADMIN — SODIUM CHLORIDE 100 ML/HR: 9 INJECTION, SOLUTION INTRAVENOUS at 05:38

## 2021-04-19 RX ADMIN — INSULIN LISPRO 8 UNITS: 100 INJECTION, SOLUTION INTRAVENOUS; SUBCUTANEOUS at 11:17

## 2021-04-19 RX ADMIN — Medication 10 ML: at 08:28

## 2021-04-19 RX ADMIN — INSULIN GLARGINE 10 UNITS: 100 INJECTION, SOLUTION SUBCUTANEOUS at 08:27

## 2021-04-19 RX ADMIN — INSULIN LISPRO 8 UNITS: 100 INJECTION, SOLUTION INTRAVENOUS; SUBCUTANEOUS at 08:27

## 2021-04-19 NOTE — DISCHARGE SUMMARY
[facility] Medicine Services  DISCHARGE SUMMARY        Prepared For PCP:  Provider, No Known    Patient Name: David Pastor  : 1990  MRN: [id number]      Date of Admission:   2021    Date of Discharge:  2021    Length of stay:  LOS: 1 day     Hospital Course     Presenting Problem:   Precordial pain [R07.2]  Methamphetamine abuse (CMS/HCC) [F15.10]  Diabetic ketoacidosis without coma associated with type 1 diabetes mellitus (CMS/HCC) [E10.10]      Active Hospital Problems    Diagnosis  POA   • **Diabetic ketoacidosis without coma associated with type 1 diabetes mellitus (CMS/HCC) [E10.10]  Yes   • Methamphetamine abuse (CMS/HCC) [F15.10]  Yes   • High anion gap metabolic acidosis [E87.2]  Yes      Resolved Hospital Problems   No resolved problems to display.           Hospital Course:  David Pastor is a 30 y.o. male admitted with DKA, patient says he is homeless, patient also noted in DKA, treated with DKA protocol, anion gap closed.  Lantus and premeal Humalog insulin was started.  Patient maintaining Accu-Cheks.  Patient he says he has the supplies.  He said he is from [place].  We will have the  to see him before discharge to help him with the PCP and also possible outpatient endocrinology follow-up.  Patient verbalized understanding to the plan.          Recommendation for Outpatient Providers:       Follow-up with PCP in a week time  Follow-up with [facility] in 2 weeks time      Reasons For Change In Medications and Indications for New Medications:        Day of Discharge     HPI:       Vital Signs:   Temp:  [97.9 °F (36.6 °C)-98.6 °F (37 °C)] 97.9 °F (36.6 °C)  Heart Rate:  [] 109  Resp:  [14-18] 14  BP: (124-150)/(67-90) 146/90     Physical Exam:  Physical Exam  Vitals and nursing note reviewed.   Constitutional:       General: He is not in acute distress.     Appearance: Normal appearance. He is well-developed. He is not ill-appearing,  toxic-appearing or diaphoretic.   HENT:      Head: Normocephalic and atraumatic.      Right Ear: Ear canal and external ear normal.      Left Ear: Ear canal and external ear normal.      Nose: Nose normal. No congestion or rhinorrhea.      Mouth/Throat:      Mouth: Mucous membranes are moist.      Pharynx: No oropharyngeal exudate.   Eyes:      General: No scleral icterus.        Right eye: No discharge.         Left eye: No discharge.      Extraocular Movements: Extraocular movements intact.      Conjunctiva/sclera: Conjunctivae normal.      Pupils: Pupils are equal, round, and reactive to light.   Neck:      Thyroid: No thyromegaly.      Vascular: No carotid bruit or JVD.      Trachea: No tracheal deviation.   Cardiovascular:      Rate and Rhythm: Normal rate and regular rhythm.      Pulses: Normal pulses.      Heart sounds: Normal heart sounds. No murmur heard.   No friction rub. No gallop.    Pulmonary:      Effort: Pulmonary effort is normal. No respiratory distress.      Breath sounds: Normal breath sounds. No stridor. No wheezing, rhonchi or rales.   Chest:      Chest wall: No tenderness.   Abdominal:      General: Bowel sounds are normal. There is no distension.      Palpations: Abdomen is soft. There is no mass.      Tenderness: There is no abdominal tenderness. There is no guarding or rebound.      Hernia: No hernia is present.   Musculoskeletal:         General: No swelling, tenderness, deformity or signs of injury. Normal range of motion.      Cervical back: Normal range of motion and neck supple. No rigidity. No muscular tenderness.      Right lower leg: No edema.      Left lower leg: No edema.   Lymphadenopathy:      Cervical: No cervical adenopathy.   Skin:     General: Skin is warm and dry.      Coloration: Skin is not jaundiced or pale.      Findings: No bruising, erythema or rash.   Neurological:      General: No focal deficit present.      Mental Status: He is alert and oriented to person, place,  and time. Mental status is at baseline.      Cranial Nerves: No cranial nerve deficit.      Sensory: No sensory deficit.      Motor: No weakness or abnormal muscle tone.      Coordination: Coordination normal.   Psychiatric:         Mood and Affect: Mood normal.         Behavior: Behavior normal.         Thought Content: Thought content normal.         Judgment: Judgment normal.         Pertinent  and/or Most Recent Results     Results from last 7 days   Lab Units 04/18/21  1749 04/18/21  1346 04/18/21  0905   WBC 10*3/mm3  --   --  20.40*   HEMOGLOBIN g/dL  --   --  14.6   HEMATOCRIT %  --   --  43.7   PLATELETS 10*3/mm3  --   --  323   SODIUM mmol/L 137 134* 129*   POTASSIUM mmol/L 4.3 4.5 4.4   CHLORIDE mmol/L 103 98 87*   CO2 mmol/L 23.0 18.0* 10.0*   BUN mg/dL 14 18 20   CREATININE mg/dL 0.96 1.14 1.18   GLUCOSE mg/dL 138* 328* 572*   CALCIUM mg/dL 8.3* 9.3 9.9     Results from last 7 days   Lab Units 04/18/21  0905   BILIRUBIN mg/dL 0.6   ALK PHOS U/L 156*   ALT (SGPT) U/L 126*   AST (SGOT) U/L 91*           Invalid input(s): TG, LDLCALC, LDLREALC  Results from last 7 days   Lab Units 04/18/21  0905   HEMOGLOBIN A1C % 14.0*   TROPONIN T ng/mL <0.010       Brief Urine Lab Results  (Last result in the past 365 days)      Color   Clarity   Blood   Leuk Est   Nitrite   Protein   CREAT   Urine HCG        04/18/21 1147 Yellow Clear Negative Negative Negative Negative               Microbiology Results Abnormal     Procedure Component Value - Date/Time    Blood Culture - Blood, Arm, Right [[id number]] Collected: 04/18/21 0905    Lab Status: Preliminary result Specimen: Blood from Arm, Right Updated: 04/19/21 0915     Blood Culture No growth at 24 hours    Blood Culture - Blood, Arm, Left [[id number]] Collected: 04/18/21 0905    Lab Status: Preliminary result Specimen: Blood from Arm, Left Updated: 04/19/21 0915     Blood Culture No growth at 24 hours          XR Chest 1 View    Result Date: 4/18/2021  Impression: 1.No  acute pulmonary process.  Electronically Signed By-Sam Orlando MD On:4/18/2021 9:52 AM This report was finalized on [id number] by  Sam Orlando MD.                          Test Results Pending at Discharge  Pending Labs     Order Current Status    Blood Culture - Blood, Arm, Left Preliminary result    Blood Culture - Blood, Arm, Right Preliminary result            Procedures Performed           Consults:   Consults     Date and Time Order Name Status Description    4/18/2021 10:16 AM Hospitalist (on-call MD unless specified) Completed             Discharge Details        Discharge Medications      New Medications      Instructions Start Date   insulin glargine 100 UNIT/ML injection  Commonly known as: LANTUS, SEMGLEE   10 Units, Subcutaneous, Every 12 Hours Scheduled      insulin lispro 100 UNIT/ML injection  Commonly known as: ADMELOG   4 Units, Subcutaneous, 3 Times Daily Before Meals             No Known Allergies      Discharge Disposition:  Home or Self Care    Diet:  Hospital:  Diet Order   Procedures   • Diet Diabetic/Consistent Carbs; Diabetic - Consistent Carb         Discharge Activity:         CODE STATUS:    Code Status and Medical Interventions:   Ordered at: 04/18/21 1032     Code Status:    CPR     Medical Interventions (Level of Support Prior to Arrest):    Full         Follow-up Appointments  No future appointments.          Condition on Discharge:      Stable      This patient has been examined wearing appropriate Personal Protective Equipment and discussed with hospital infection control department. 04/19/21      Electronically signed by Radha Porter MD, 04/19/21, 12:40 PM EDT.      Time: I spent  33  minutes on this discharge activity which included face-to-face encounter with the patient/reviewing the data in the system/coordination of the care with the nursing staff as well as consultants/documentation/entering orders.

## 2021-04-19 NOTE — SIGNIFICANT NOTE
Pt refusing all lab work at this time. Pt educated on importance of lab work. Will try again later.

## 2021-04-19 NOTE — CASE MANAGEMENT/SOCIAL WORK
Continued Stay Note  KIMANI Sander     Patient Name: David Pastor  MRN: [id number]  Today's Date: 4/19/2021    Admit Date: 4/18/2021    Discharge Plan     Row Name 04/19/21 1329       Plan    Provided Post Acute Provider List?  Refused       Plan  DC Plan: Routine home.    Patient/Family in Agreement with Plan  yes    Plan Comments  CM met with patient at bedside to discuss dc planning. Patient reported he was ready to leave. Patient inquired about dc needs assessment but patient repeated that he was ready to leave. Per DM educator, patient would be discharging with insulin. Meds to bed pharmacy confirmed. Per RN, after dc orders were placed, patient left prior to meds to bed delivery. She reported that patient did not take his dc papers with him either. MSW was notified earlier of uninsured status.    Final Discharge Disposition Code  01 - home or self-care          Expected Discharge Date and Time     Expected Discharge Date Expected Discharge Time    Apr 19, 2021         Met with patient in room wearing PPE: mask/goggles.      Maintained distance greater than six feet and spent less than 15 minutes in the room.      Mayra Luna RN     Office Phone: [phone]  Office Cell: [phone]

## 2021-04-19 NOTE — CONSULTS
"Diabetes Education  Assessment/Teaching    Patient Name:  David Pastor  YOB: 1990  MRN: [id number]  Admit Date:  4/18/2021      Assessment Date:  4/19/2021    Most Recent Value   General Information    Referral From:  MD order, Blood glucose [MD consult for DKA and admission blood sugar was 572.]   Height  172.7 cm (68\")   Weight  74.2 kg (163 lb 9.3 oz)   Weight Method  Bed scale   Pregnancy Assessment   Diabetes History   What type of diabetes do you have?  Type 1   Length of Diabetes Diagnosis  10 + years [Patient stated that he was diagnosed 13 years ago.]   Current DM knowledge  fair   Have you had diabetes education/teaching in the past?  yes   When and where was your diabetes education?  In [place] when 1st diagnosed   Do you test your blood sugar at home?  no   Have you had high blood sugar? (>140mg/dl)  yes   How often do you have high blood sugar?  unknown   When was your last high blood sugar?  Admission blood sugar 572.   Education Preferences   What areas of diabetes would you like to learn about?  avoiding high blood sugar, avoiding low blood sugar, diabetes complications   Nutrition Information   Assessment Topics   Taking Medication - Assessment  Needs education   Problem Solving - Assessment  Needs education   Reducing Risk - Assessment  Needs education   Monitoring - Assessment  Needs education   DM Goals   Taking Medication - Goal  Today   Problem Solving - Goal  Today   Reducing Risk - Goal  Today   Monitoring - Goal  Today            Most Recent Value   DM Education Needs   Meter  Has own [Patient stated that he has a meter and testing supplies but he just hasn't been checking his blood sugars.]   Medication  Insulin [Patient stated he takes ReliOn Novolin 70/30 30 units BID when he has insulin.]   Problem Solving  Hypoglycemia, Hyperglycemia, Sick days, Signs, Symptoms, Treatment [Patient did not want handouts on DKA, hypoglycemia, and hyperglycemia. Patient stated he has ketone " strips to check his urine.]   Reducing Risks  A1C testing [A1c ordered but not resulted.]   Discharge Plan  Home   Motivation  Not interested   Teaching Method  Discussion, Handouts   Patient Response  Verbalized understanding            Other Comments:  A1c info sheet given with discussion on A1c target and healthy blood sugar range. Patient stated that he doesn't have an endocrinologist or a doctor to manage his diabetes.  He gets his insulin at [facility]. Patient stated that he is from [place], homeless, uninsured, and in need of pen needles, and insulin pens. Secure chat sent to case management and . Discussed with patient the importance of checking blood sugars and to try 2X a day before taking insulin. Patient stated he knows he has abused drugs, is tired of it, and just wants to stop. Patient stated he wanted to get in touch with his father Rajesh, whom he hasn't spoken to in quite some time.  Educator dialed number [phone], put on speaker phone but no answer, so left message. Prescriptions started in discharge orders for Novolin 70/30 flex pen and pen needles. Patient has no further questions or concerns related to diabetes at this time.        Electronically signed by:  Toya Tejeda RN  04/19/21 11:39 EDT

## 2021-04-19 NOTE — PLAN OF CARE
Goal Outcome Evaluation:         Pt off insulin drip. WBC on admit >20. HR upon exertion >140's, and pt has reddened, inflamed appearing track marks. RYLIE St notified upon start of shift, and pt started on NS as well as ceftriaxone. Pt continues to refuse labs, and vitals. Will continue to attempt to educate pt on importance of compliance with treatment.

## 2021-04-19 NOTE — CASE MANAGEMENT/SOCIAL WORK
Continued Stay Note   Sander     Patient Name: David Pastor  MRN: [id number]  Today's Date: 4/19/2021    Admit Date: 4/18/2021    Discharge Plan     Row Name 04/19/21 1708       Plan    Plan  DC Plan: Routine home.    Plan Comments  SW met with pt at the bedside to discuss d/c planning. Pt kept eyes closed and provided only yes or no responses to SW's questions. SW inquired if pt has a home. Pt stated he does not. SW inquired what plans are for when pt leaves hospital. Pt did not respond. SW inquired if pt would like SW to seek shelter placement and pt stated he would prefer not to do this. SW inquired where pt is staying and pt stated he does not have anywhere where he stays. SW verified that pt sleeps outside. SW inquired if pt would like some community resources for individuals who are homeless, pt stated he would accept resources. SW explained that she would have to bring these resources back to pt's room. SW also informed pt (per CM) that  OP Rx (Meds to Bed) is to come to pt's room with pt's insulin. Per RN, pt left the hospital prior to SW being able to return with resources and before his meds could be delivered to the bedside.        Met with patient in room wearing PPE: mask, face shield/goggles.      Maintained distance greater than six feet and spent less than 15 minutes in the room.      Nisha Shine [facility] [place], [facility]    Office: [phone]  Cell: [phone]  Fax: [phone]  [email or web address]: [email or web address]

## 2021-04-21 LAB — QT INTERVAL: 378 MS

## 2021-04-23 LAB
BACTERIA SPEC AEROBE CULT: NORMAL
BACTERIA SPEC AEROBE CULT: NORMAL